# Patient Record
Sex: MALE | URBAN - METROPOLITAN AREA
[De-identification: names, ages, dates, MRNs, and addresses within clinical notes are randomized per-mention and may not be internally consistent; named-entity substitution may affect disease eponyms.]

---

## 2019-07-03 ENCOUNTER — APPOINTMENT (RX ONLY)
Dept: URBAN - METROPOLITAN AREA CLINIC 57 | Facility: CLINIC | Age: 29
Setting detail: DERMATOLOGY
End: 2019-07-03

## 2019-07-03 DIAGNOSIS — L64.8 OTHER ANDROGENIC ALOPECIA: ICD-10-CM

## 2019-07-03 PROCEDURE — ? TREATMENT REGIMEN

## 2019-07-03 PROCEDURE — ? PRESCRIPTION

## 2019-07-03 PROCEDURE — ? COUNSELING

## 2019-07-03 PROCEDURE — 99202 OFFICE O/P NEW SF 15 MIN: CPT

## 2019-07-03 PROCEDURE — ? ORDER TESTS

## 2019-07-03 RX ORDER — FINASTERIDE 1 MG/1
TABLET, FILM COATED ORAL QD
Qty: 30 | Refills: 3 | Status: ERX | COMMUNITY
Start: 2019-07-03

## 2019-07-03 RX ADMIN — FINASTERIDE: 1 TABLET, FILM COATED ORAL at 21:02

## 2019-07-03 ASSESSMENT — LOCATION DETAILED DESCRIPTION DERM: LOCATION DETAILED: MID-FRONTAL SCALP

## 2019-07-03 ASSESSMENT — LOCATION SIMPLE DESCRIPTION DERM: LOCATION SIMPLE: ANTERIOR SCALP

## 2019-07-03 ASSESSMENT — LOCATION ZONE DERM: LOCATION ZONE: SCALP

## 2019-07-03 NOTE — PROCEDURE: ORDER TESTS
Billing Type: Third-Party Bill
Performing Laboratory: [id number]
Bill For Surgical Tray: no
Expected Date Of Service: 07/03/2019

## 2019-07-03 NOTE — HPI: HAIR LOSS
How Did The Hair Loss Occur?: sudden in onset
How Severe Is Your Hair Loss?: mild
Additional History: Pt states that he was using finasteride approximately 5-6 years ago which worked well. He had to stop use due to the high cost. He then tried using minoxidil (for about 3 years) but stopped using it due to the residue it was leaving in his hair. He started using finasteride again through the [facility]” service. He notes that it worked as it did before, but has recently (past 2-3 months) noticed that it stopped working. He is here today to explore his other options.

## 2019-07-03 NOTE — PROCEDURE: MIPS QUALITY
Quality 130: Documentation Of Current Medications In The Medical Record: Current Medications Documented
Quality 110: Preventive Care And Screening: Influenza Immunization: Influenza Immunization previously received during influenza season
Quality 226: Preventive Care And Screening: Tobacco Use: Screening And Cessation Intervention: Patient screened for tobacco use and is an ex/non-smoker
Quality 111:Pneumonia Vaccination Status For Older Adults: Pneumococcal Vaccination Previously Received
Detail Level: Detailed

## 2019-07-03 NOTE — PROCEDURE: TREATMENT REGIMEN
Detail Level: Zone
Initiate Treatment: finasteride 1 mg tablet Qd
Plan: Discussed Minoxidil, PLP laser, and [facility] hair clinic as possible treatment options. Pt opted to try finasteride through his local pharmacy rather than “[facility]” to see if there is a difference with the different formulary. Also discussed performing bx at [facility] pending labs coming back [facility].

## 2019-09-26 ENCOUNTER — RX ONLY (OUTPATIENT)
Age: 29
Setting detail: RX ONLY
End: 2019-09-26

## 2019-09-26 RX ORDER — FINASTERIDE 1 MG/1
TABLET, FILM COATED ORAL
Qty: 90 | Refills: 3 | Status: ERX

## 2020-02-24 ENCOUNTER — APPOINTMENT (RX ONLY)
Dept: URBAN - METROPOLITAN AREA CLINIC 57 | Facility: CLINIC | Age: 30
Setting detail: DERMATOLOGY
End: 2020-02-24

## 2020-02-24 DIAGNOSIS — L64.8 OTHER ANDROGENIC ALOPECIA: ICD-10-CM

## 2020-02-24 PROCEDURE — ? TREATMENT REGIMEN

## 2020-02-24 PROCEDURE — ? COUNSELING

## 2020-02-24 PROCEDURE — 99213 OFFICE O/P EST LOW 20 MIN: CPT

## 2020-02-24 PROCEDURE — ? PRESCRIPTION

## 2020-02-24 ASSESSMENT — LOCATION ZONE DERM: LOCATION ZONE: SCALP

## 2020-02-24 ASSESSMENT — LOCATION SIMPLE DESCRIPTION DERM: LOCATION SIMPLE: ANTERIOR SCALP

## 2020-02-24 ASSESSMENT — LOCATION DETAILED DESCRIPTION DERM: LOCATION DETAILED: MID-FRONTAL SCALP

## 2020-02-24 NOTE — PROCEDURE: TREATMENT REGIMEN
Detail Level: Zone
Discontinue Regimen: finasteride- not helping and no sees no improvement
Initiate Treatment: dutasteride 0.5 mg capsule Take 1 cap po qd
Plan: Discussed PRP injections vs dutasteride vs red light treatment. \\nDiscussed if patient would like PRP we can send a referral to Dr. Ryan. \\nPatient will call after 6 months if medication is not helping and we can send over information to Dr. Ryan.

## 2020-11-17 ENCOUNTER — APPOINTMENT (RX ONLY)
Dept: URBAN - METROPOLITAN AREA TELEMEDICINE 2 | Facility: TELEMEDICINE | Age: 30
Setting detail: DERMATOLOGY
End: 2020-11-17

## 2020-11-17 DIAGNOSIS — L65.9 NONSCARRING HAIR LOSS, UNSPECIFIED: ICD-10-CM

## 2020-11-17 PROCEDURE — ? COSMETIC CONSULTATION: PRP

## 2020-11-17 ASSESSMENT — LOCATION SIMPLE DESCRIPTION DERM
LOCATION SIMPLE: SUPERIOR FOREHEAD
LOCATION SIMPLE: SCALP
LOCATION SIMPLE: RIGHT SCALP
LOCATION SIMPLE: LEFT SCALP

## 2020-11-17 ASSESSMENT — LOCATION DETAILED DESCRIPTION DERM
LOCATION DETAILED: SUPERIOR MID FOREHEAD
LOCATION DETAILED: LEFT CENTRAL FRONTAL SCALP
LOCATION DETAILED: RIGHT CENTRAL FRONTAL SCALP
LOCATION DETAILED: LEFT LATERAL FRONTAL SCALP
LOCATION DETAILED: LEFT SUPERIOR PARIETAL SCALP

## 2020-11-17 ASSESSMENT — LOCATION ZONE DERM
LOCATION ZONE: FACE
LOCATION ZONE: SCALP

## 2020-12-01 ENCOUNTER — APPOINTMENT (RX ONLY)
Dept: URBAN - METROPOLITAN AREA TELEMEDICINE 2 | Facility: TELEMEDICINE | Age: 30
Setting detail: DERMATOLOGY
End: 2020-12-01

## 2020-12-01 DIAGNOSIS — L65.9 NONSCARRING HAIR LOSS, UNSPECIFIED: ICD-10-CM

## 2020-12-01 PROCEDURE — ? OTHER (COSMETIC)

## 2020-12-01 PROCEDURE — ? PLATELET RICH PLASMA INJECTION

## 2020-12-01 ASSESSMENT — LOCATION SIMPLE DESCRIPTION DERM
LOCATION SIMPLE: ANTERIOR SCALP
LOCATION SIMPLE: RIGHT SCALP
LOCATION SIMPLE: RIGHT FOREHEAD
LOCATION SIMPLE: LEFT FOREHEAD
LOCATION SIMPLE: LEFT SCALP

## 2020-12-01 ASSESSMENT — LOCATION DETAILED DESCRIPTION DERM
LOCATION DETAILED: RIGHT CENTRAL FRONTAL SCALP
LOCATION DETAILED: RIGHT SUPERIOR MEDIAL FOREHEAD
LOCATION DETAILED: MID-FRONTAL SCALP
LOCATION DETAILED: LEFT MEDIAL FRONTAL SCALP
LOCATION DETAILED: LEFT SUPERIOR MEDIAL FOREHEAD
LOCATION DETAILED: LEFT CENTRAL FRONTAL SCALP
LOCATION DETAILED: RIGHT MEDIAL FRONTAL SCALP

## 2020-12-01 ASSESSMENT — LOCATION ZONE DERM
LOCATION ZONE: SCALP
LOCATION ZONE: FACE

## 2020-12-01 NOTE — PROCEDURE: OTHER (COSMETIC)
Detail Level: Zone
Other (Free Text): 10 total injection sites \\n5 ML PRP \\n\\nPt understands risks and side effects \\n\\n#1/5

## 2020-12-01 NOTE — PROCEDURE: PLATELET RICH PLASMA INJECTION
Depth In Mm (Will Not Render If 0): 4
Amount Injected At This Location In Cc (Will Not Render If 0): 0
Standard Default Technique For Making Prp: Blood draw, spin for 5 mins
Post-Care Instructions: After the procedure, take precautions agains sun exposure. Do not apply sunscreen for 12 hours after the procedure. Do not apply make-up for 12 hours after the procedure. Avoid alcohol based toners for 10-14 days. After 2-3 days patients can return to their regular skin regimen.
Which Technique?: Default
Amount Injected At This Location In Cc (Will Not Render If 0): 10
Treatment Number (Optional): 1
Technique 2: Dr kwan
Show Additional Techniques: Yes
Detail Level: Zone
Location #1: “headband”
Amount Injected At This Location In Cc (Will Not Render If 0): 6
Consent: Written consent obtained, risks reviewed including but not limited to pain, scarring, infection and incomplete improvement.  Patient understands the procedure is cosmetic in nature and will require out of pocket payment.
Amount Injected At This Location In Cc (Will Not Render If 0): 2.5

## 2020-12-30 ENCOUNTER — APPOINTMENT (RX ONLY)
Dept: URBAN - METROPOLITAN AREA TELEMEDICINE 2 | Facility: TELEMEDICINE | Age: 30
Setting detail: DERMATOLOGY
End: 2020-12-30

## 2020-12-30 DIAGNOSIS — L65.9 NONSCARRING HAIR LOSS, UNSPECIFIED: ICD-10-CM

## 2020-12-30 PROCEDURE — ? PLATELET RICH PLASMA INJECTION

## 2020-12-30 ASSESSMENT — LOCATION SIMPLE DESCRIPTION DERM
LOCATION SIMPLE: LEFT SCALP
LOCATION SIMPLE: ANTERIOR SCALP
LOCATION SIMPLE: RIGHT SCALP
LOCATION SIMPLE: SUPERIOR FOREHEAD
LOCATION SIMPLE: LEFT FOREHEAD

## 2020-12-30 ASSESSMENT — LOCATION DETAILED DESCRIPTION DERM
LOCATION DETAILED: MID-FRONTAL SCALP
LOCATION DETAILED: RIGHT CENTRAL FRONTAL SCALP
LOCATION DETAILED: LEFT CENTRAL FRONTAL SCALP
LOCATION DETAILED: SUPERIOR MID FOREHEAD
LOCATION DETAILED: LEFT SUPERIOR FOREHEAD
LOCATION DETAILED: RIGHT MEDIAL FRONTAL SCALP

## 2020-12-30 ASSESSMENT — LOCATION ZONE DERM
LOCATION ZONE: FACE
LOCATION ZONE: SCALP

## 2020-12-30 NOTE — PROCEDURE: PLATELET RICH PLASMA INJECTION
Detail Level: Zone
Depth In Mm (Will Not Render If 0): 0
Technique 2: Dr kwan
Consent: Written consent obtained, risks reviewed including but not limited to pain, scarring, infection and incomplete improvement.  Patient understands the procedure is cosmetic in nature and will require out of pocket payment.
Amount Injected At This Location In Cc (Will Not Render If 0): 2.5
Depth In Mm (Will Not Render If 0): 4
Show Additional Techniques: Yes
Amount Injected At This Location In Cc (Will Not Render If 0): 5.5
Post-Care Instructions: After the procedure, take precautions agains sun exposure. Do not apply sunscreen for 12 hours after the procedure. Do not apply make-up for 12 hours after the procedure. Avoid alcohol based toners for 10-14 days. After 2-3 days patients can return to their regular skin regimen.
Which Technique?: Default
Standard Default Technique For Making Prp: Blood draw, spin for 5 mins
Treatment Number (Optional): 2

## 2021-02-23 ENCOUNTER — APPOINTMENT (RX ONLY)
Dept: URBAN - METROPOLITAN AREA CLINIC 5 | Facility: CLINIC | Age: 31
Setting detail: DERMATOLOGY
End: 2021-02-23

## 2021-02-23 DIAGNOSIS — L65.9 NONSCARRING HAIR LOSS, UNSPECIFIED: ICD-10-CM

## 2021-02-23 PROCEDURE — ? PLATELET RICH PLASMA INJECTION

## 2021-02-23 RX ORDER — DUTASTERIDE 0.5 MG/1
CAPSULE ORAL QD
Qty: 30 | Refills: 0 | Status: ERX

## 2021-02-23 ASSESSMENT — LOCATION ZONE DERM: LOCATION ZONE: SCALP

## 2021-02-23 ASSESSMENT — LOCATION DETAILED DESCRIPTION DERM: LOCATION DETAILED: LEFT MEDIAL FRONTAL SCALP

## 2021-02-23 ASSESSMENT — LOCATION SIMPLE DESCRIPTION DERM: LOCATION SIMPLE: LEFT SCALP

## 2021-02-23 NOTE — PROCEDURE: PLATELET RICH PLASMA INJECTION
Treatment Number (Optional): 0
Which Technique?: Default
Depth In Mm (Will Not Render If 0): 1
Detail Level: Zone
Location #1: scalp
Show Additional Techniques: Yes
Standard Default Technique For Making Prp: Blood draw centrifuge 22cc to scalp
Consent: Written consent obtained, risks reviewed including but not limited to pain, scarring, infection and incomplete improvement.  Patient understands the procedure is cosmetic in nature and will require out of pocket payment.
Post-Care Instructions: After the procedure, take precautions agains sun exposure. Do not apply sunscreen for 12 hours after the procedure. Do not apply make-up for 12 hours after the procedure. Avoid alcohol based toners for 10-14 days. After 2-3 days patients can return to their regular skin regimen.

## 2021-03-04 ENCOUNTER — APPOINTMENT (RX ONLY)
Dept: URBAN - METROPOLITAN AREA CLINIC 125 | Facility: CLINIC | Age: 31
Setting detail: DERMATOLOGY
End: 2021-03-04

## 2021-03-04 DIAGNOSIS — L64.8 OTHER ANDROGENIC ALOPECIA: ICD-10-CM | Status: STABLE

## 2021-03-04 PROCEDURE — ? COUNSELING

## 2021-03-04 PROCEDURE — 99213 OFFICE O/P EST LOW 20 MIN: CPT

## 2021-03-04 PROCEDURE — ? TREATMENT REGIMEN

## 2021-03-04 PROCEDURE — ? PRESCRIPTION

## 2021-03-04 RX ORDER — DUTASTERIDE 0.5 MG/1
CAPSULE, LIQUID FILLED ORAL QD
Qty: 90 | Refills: 4 | Status: ERX

## 2021-03-04 ASSESSMENT — LOCATION ZONE DERM: LOCATION ZONE: SCALP

## 2021-03-04 ASSESSMENT — LOCATION SIMPLE DESCRIPTION DERM: LOCATION SIMPLE: ANTERIOR SCALP

## 2021-03-04 ASSESSMENT — LOCATION DETAILED DESCRIPTION DERM: LOCATION DETAILED: MID-FRONTAL SCALP

## 2021-03-04 NOTE — PROCEDURE: TREATMENT REGIMEN
Detail Level: Zone
Continue Regimen: dutasteride 0.5 mg capsule Take 1 cap po qd (rf sent today)
Plan: Discussed other treatment options like red light therapy, minoxidil, or oral allergy medications.

## 2021-04-26 ENCOUNTER — APPOINTMENT (RX ONLY)
Dept: URBAN - METROPOLITAN AREA CLINIC 5 | Facility: CLINIC | Age: 31
Setting detail: DERMATOLOGY
End: 2021-04-26

## 2021-04-26 DIAGNOSIS — L65.9 NONSCARRING HAIR LOSS, UNSPECIFIED: ICD-10-CM

## 2021-04-26 PROCEDURE — ? PLATELET RICH PLASMA INJECTION

## 2021-04-26 ASSESSMENT — LOCATION ZONE DERM: LOCATION ZONE: SCALP

## 2021-04-26 ASSESSMENT — LOCATION SIMPLE DESCRIPTION DERM: LOCATION SIMPLE: LEFT SCALP

## 2021-04-26 ASSESSMENT — LOCATION DETAILED DESCRIPTION DERM: LOCATION DETAILED: LEFT MEDIAL FRONTAL SCALP

## 2021-04-26 NOTE — PROCEDURE: PLATELET RICH PLASMA INJECTION
Depth In Mm (Will Not Render If 0): 0
Post-Care Instructions: After the procedure, take precautions agains sun exposure. Do not apply sunscreen for 12 hours after the procedure. Do not apply make-up for 12 hours after the procedure. Avoid alcohol based toners for 10-14 days. After 2-3 days patients can return to their regular skin regimen.
Depth In Mm (Will Not Render If 0): 1
Detail Level: Zone
Standard Default Technique For Making Prp: Blood draw centrifuge 22cc to scalp
Which Technique?: Default
Show Additional Techniques: Yes
Treatment Number (Optional): 5
Consent: Written consent obtained, risks reviewed including but not limited to pain, scarring, infection and incomplete improvement.  Patient understands the procedure is cosmetic in nature and will require out of pocket payment.

## 2021-05-24 ENCOUNTER — APPOINTMENT (RX ONLY)
Dept: URBAN - METROPOLITAN AREA CLINIC 5 | Facility: CLINIC | Age: 31
Setting detail: DERMATOLOGY
End: 2021-05-24

## 2021-05-24 DIAGNOSIS — L65.9 NONSCARRING HAIR LOSS, UNSPECIFIED: ICD-10-CM

## 2021-05-24 PROCEDURE — ? PLATELET RICH PLASMA INJECTION

## 2021-05-24 ASSESSMENT — LOCATION ZONE DERM: LOCATION ZONE: SCALP

## 2021-05-24 ASSESSMENT — LOCATION SIMPLE DESCRIPTION DERM: LOCATION SIMPLE: LEFT SCALP

## 2021-05-24 ASSESSMENT — LOCATION DETAILED DESCRIPTION DERM: LOCATION DETAILED: LEFT MEDIAL FRONTAL SCALP

## 2021-05-24 NOTE — PROCEDURE: PLATELET RICH PLASMA INJECTION
Which Technique?: Default
Standard Default Technique For Making Prp: Blood draw centrifuge 22cc to scalp
Amount Injected At This Location In Cc (Will Not Render If 0): 0
Post-Care Instructions: After the procedure, take precautions agains sun exposure. Do not apply sunscreen for 12 hours after the procedure. Do not apply make-up for 12 hours after the procedure. Avoid alcohol based toners for 10-14 days. After 2-3 days patients can return to their regular skin regimen.
Treatment Number (Optional): 1
Show Additional Techniques: Yes
Detail Level: Zone
Consent: Written consent obtained, risks reviewed including but not limited to pain, scarring, infection and incomplete improvement.  Patient understands the procedure is cosmetic in nature and will require out of pocket payment.

## 2021-06-24 ENCOUNTER — APPOINTMENT (RX ONLY)
Dept: URBAN - METROPOLITAN AREA CLINIC 5 | Facility: CLINIC | Age: 31
Setting detail: DERMATOLOGY
End: 2021-06-24

## 2021-06-24 DIAGNOSIS — L65.9 NONSCARRING HAIR LOSS, UNSPECIFIED: ICD-10-CM

## 2021-06-24 PROCEDURE — ? PLATELET RICH PLASMA INJECTION

## 2021-06-24 ASSESSMENT — LOCATION DETAILED DESCRIPTION DERM: LOCATION DETAILED: LEFT MEDIAL FRONTAL SCALP

## 2021-06-24 ASSESSMENT — LOCATION ZONE DERM: LOCATION ZONE: SCALP

## 2021-06-24 ASSESSMENT — LOCATION SIMPLE DESCRIPTION DERM: LOCATION SIMPLE: LEFT SCALP

## 2021-06-24 NOTE — PROCEDURE: PLATELET RICH PLASMA INJECTION
Consent: Written consent obtained, risks reviewed including but not limited to pain, scarring, infection and incomplete improvement.  Patient understands the procedure is cosmetic in nature and will require out of pocket payment.
Amount Injected At This Location In Cc (Will Not Render If 0): 0
Which Technique?: Default
Standard Default Technique For Making Prp: Blood draw centrifuge 22cc to scalp
Post-Care Instructions: After the procedure, take precautions agains sun exposure. Do not apply sunscreen for 12 hours after the procedure. Do not apply make-up for 12 hours after the procedure. Avoid alcohol based toners for 10-14 days. After 2-3 days patients can return to their regular skin regimen.
Depth In Mm (Will Not Render If 0): 1
Detail Level: Zone
Location #1: head scalp
Show Additional Techniques: Yes

## 2021-07-27 ENCOUNTER — APPOINTMENT (RX ONLY)
Dept: URBAN - METROPOLITAN AREA CLINIC 5 | Facility: CLINIC | Age: 31
Setting detail: DERMATOLOGY
End: 2021-07-27

## 2021-07-27 DIAGNOSIS — L65.9 NONSCARRING HAIR LOSS, UNSPECIFIED: ICD-10-CM

## 2021-07-27 PROCEDURE — ? PLATELET RICH PLASMA INJECTION

## 2021-07-27 ASSESSMENT — LOCATION DETAILED DESCRIPTION DERM: LOCATION DETAILED: LEFT MEDIAL FRONTAL SCALP

## 2021-07-27 ASSESSMENT — LOCATION ZONE DERM: LOCATION ZONE: SCALP

## 2021-07-27 ASSESSMENT — LOCATION SIMPLE DESCRIPTION DERM: LOCATION SIMPLE: LEFT SCALP

## 2021-07-27 NOTE — PROCEDURE: PLATELET RICH PLASMA INJECTION
Depth In Mm (Will Not Render If 0): 0
Standard Default Technique For Making Prp: Blood draw centrifuge 22cc to scalp
Detail Level: Zone
Consent: Written consent obtained, risks reviewed including but not limited to pain, scarring, infection and incomplete improvement.  Patient understands the procedure is cosmetic in nature and will require out of pocket payment.
Depth In Mm (Will Not Render If 0): 1
Show Additional Techniques: Yes
Which Technique?: Default
Post-Care Instructions: After the procedure, take precautions agains sun exposure. Do not apply sunscreen for 12 hours after the procedure. Do not apply make-up for 12 hours after the procedure. Avoid alcohol based toners for 10-14 days. After 2-3 days patients can return to their regular skin regimen.

## 2021-11-10 ENCOUNTER — APPOINTMENT (RX ONLY)
Dept: URBAN - METROPOLITAN AREA CLINIC 5 | Facility: CLINIC | Age: 31
Setting detail: DERMATOLOGY
End: 2021-11-10

## 2021-11-10 DIAGNOSIS — L65.9 NONSCARRING HAIR LOSS, UNSPECIFIED: ICD-10-CM

## 2021-11-10 PROCEDURE — ? PLATELET RICH PLASMA INJECTION

## 2021-11-10 ASSESSMENT — LOCATION DETAILED DESCRIPTION DERM: LOCATION DETAILED: LEFT MEDIAL FRONTAL SCALP

## 2021-11-10 ASSESSMENT — LOCATION ZONE DERM: LOCATION ZONE: SCALP

## 2021-11-10 ASSESSMENT — LOCATION SIMPLE DESCRIPTION DERM: LOCATION SIMPLE: LEFT SCALP

## 2021-11-10 NOTE — PROCEDURE: PLATELET RICH PLASMA INJECTION
Post-Care Instructions: After the procedure, take precautions agains sun exposure. Do not apply sunscreen for 12 hours after the procedure. Do not apply make-up for 12 hours after the procedure. Avoid alcohol based toners for 10-14 days. After 2-3 days patients can return to their regular skin regimen.
Amount Injected At This Location In Cc (Will Not Render If 0): 0
Show Additional Techniques: Yes
Which Technique?: Default
Location #1: top of hairline
Detail Level: Zone
Standard Default Technique For Making Prp: Blood draw centrifuge 22cc to scalp
Consent: Written consent obtained, risks reviewed including but not limited to pain, scarring, infection and incomplete improvement.  Patient understands the procedure is cosmetic in nature and will require out of pocket payment.
Depth In Mm (Will Not Render If 0): 1

## 2022-09-27 ENCOUNTER — APPOINTMENT (RX ONLY)
Dept: URBAN - METROPOLITAN AREA CLINIC 5 | Facility: CLINIC | Age: 32
Setting detail: DERMATOLOGY
End: 2022-09-27

## 2022-09-27 DIAGNOSIS — L65.9 NONSCARRING HAIR LOSS, UNSPECIFIED: ICD-10-CM

## 2022-09-27 PROCEDURE — ? PLATELET RICH PLASMA INJECTION

## 2022-09-27 ASSESSMENT — LOCATION ZONE DERM: LOCATION ZONE: SCALP

## 2022-09-27 ASSESSMENT — LOCATION SIMPLE DESCRIPTION DERM: LOCATION SIMPLE: FRONTAL SCALP

## 2022-09-27 ASSESSMENT — LOCATION DETAILED DESCRIPTION DERM: LOCATION DETAILED: MEDIAL FRONTAL SCALP

## 2022-09-27 NOTE — PROCEDURE: PLATELET RICH PLASMA INJECTION
Post-Care Instructions: After the procedure, take precautions agains sun exposure. Do not apply sunscreen for 12 hours after the procedure. Do not apply make-up for 12 hours after the procedure. Avoid alcohol based toners for 10-14 days. After 2-3 days patients can return to their regular skin regimen.
Amount Injected At This Location In Cc (Will Not Render If 0): 0
Amount Injected At This Location In Cc (Will Not Render If 0): 10
Detail Level: Zone
Treatment Number (Optional): 5
Anesthesia Type: 1% lidocaine with epinephrine
Venipuncture Paragraph: An alcohol pad was applied to the venipuncture site. Venipuncture was performed using a butterfly needle. Pressure and a bandaid was applied to the site. No complications were noted.
Show Additional Techniques: Yes
Which Technique?: Default
Consent: Written consent obtained, risks reviewed including but not limited to pain, scarring, infection and incomplete improvement.  Patient understands the procedure is cosmetic in nature and will require out of pocket payment.